# Patient Record
Sex: FEMALE | Race: WHITE
[De-identification: names, ages, dates, MRNs, and addresses within clinical notes are randomized per-mention and may not be internally consistent; named-entity substitution may affect disease eponyms.]

---

## 2021-06-12 ENCOUNTER — HOSPITAL ENCOUNTER (EMERGENCY)
Dept: HOSPITAL 56 - MW.ED | Age: 36
LOS: 1 days | Discharge: HOME | End: 2021-06-13
Payer: COMMERCIAL

## 2021-06-12 DIAGNOSIS — K80.20: Primary | ICD-10-CM

## 2021-06-12 PROCEDURE — 80053 COMPREHEN METABOLIC PANEL: CPT

## 2021-06-12 PROCEDURE — 99284 EMERGENCY DEPT VISIT MOD MDM: CPT

## 2021-06-12 PROCEDURE — 85025 COMPLETE CBC W/AUTO DIFF WBC: CPT

## 2021-06-12 PROCEDURE — 83690 ASSAY OF LIPASE: CPT

## 2021-06-12 PROCEDURE — 96372 THER/PROPH/DIAG INJ SC/IM: CPT

## 2021-06-12 PROCEDURE — 81025 URINE PREGNANCY TEST: CPT

## 2021-06-12 PROCEDURE — 36415 COLL VENOUS BLD VENIPUNCTURE: CPT

## 2021-06-12 NOTE — EDM.PDOC
ED HPI GENERAL MEDICAL PROBLEM





- General


Chief Complaint: Abdominal Pain


Stated Complaint: GALLSTONE, ABDOMINAL PAIN


Time Seen by Provider: 06/12/21 22:57


Source of Information: Reports: Patient


History Limitations: Reports: No Limitations





- History of Present Illness


INITIAL COMMENTS - FREE TEXT/NARRATIVE: 





Patient is a 36-year-old female who presents today for sarah pain.  Patient was

seen yesterday in the walk-in clinic ultrasound shows gallstones and possible in

her neck.  Patient the time not having cholecystitis and was sent home to 

follow-up with surgery on Monday.  Patient is sedated the pain increase in 

abdomen but not any worse or better with anything was not radiating.  Patient 

she is able tolerate p.o.  Patient not take any meds for pain at home.


  ** abd


Pain Score (Numeric/FACES): 4





- Related Data


                                    Allergies











Allergy/AdvReac Type Severity Reaction Status Date / Time


 


No Known Allergies Allergy   Verified 06/12/21 22:33











Home Meds: 


                                    Home Meds





. [No Known Home Meds]  06/12/21 [History]











Past Medical History





- Past Health History


Medical/Surgical History: Denies Medical/Surgical History





- Infectious Disease History


Infectious Disease History: Reports: Chicken Pox





Social & Family History





- Family History


Family Medical History: Unobtainable





ED ROS GENERAL





- Review of Systems


Review Of Systems: See Below


Constitutional: Reports: No Symptoms


HEENT: Reports: No Symptoms


Respiratory: Reports: No Symptoms


Cardiovascular: Reports: No Symptoms


Endocrine: Reports: No Symptoms


GI/Abdominal: Reports: Abdominal Pain


: Reports: No Symptoms


Musculoskeletal: Reports: No Symptoms


Skin: Reports: No Symptoms


Neurological: Reports: No Symptoms


Psychiatric: Reports: No Symptoms


Hematologic/Lymphatic: Reports: No Symptoms


Immunologic: Reports: No Symptoms





ED EXAM, GI/ABD





- Physical Exam


Exam: See Below


Exam Limited By: No Limitations


General Appearance: Alert, WD/WN, No Apparent Distress


Respiratory/Chest: No Respiratory Distress, Lungs Clear, Normal Breath Sounds


Cardiovascular: Normal Peripheral Pulses, Regular Rate, Rhythm


GI/Abdominal Exam: Normal Bowel Sounds, Soft, No Distention, Tender


Extremities: Normal Inspection, Normal Range of Motion


Neurological: Alert, Oriented, CN II-XII Intact, Normal Cognition, Normal Gait





Course





- Vital Signs


Last Recorded V/S: 


                                Last Vital Signs











Temp  97.4 F   06/12/21 22:10


 


Pulse  75   06/12/21 22:10


 


Resp  18   06/12/21 22:10


 


BP  138/76   06/12/21 22:10


 


Pulse Ox  99   06/12/21 22:10














- Orders/Labs/Meds


Labs: 


                                Laboratory Tests











  06/12/21 06/12/21 06/12/21 Range/Units





  23:44 23:50 23:50 


 


WBC   10.99   (4.0-11.0)  K/uL


 


RBC   4.37   (4.30-5.90)  M/uL


 


Hgb   14.1   (12.0-16.0)  g/dL


 


Hct   41.4   (36.0-46.0)  %


 


MCV   94.7   (80.0-98.0)  fL


 


MCH   32.3 H   (27.0-32.0)  pg


 


MCHC   34.1   (31.0-37.0)  g/dL


 


RDW Std Deviation   42.8   (28.0-62.0)  fl


 


RDW Coeff of Ely   12   (11.0-15.0)  %


 


Plt Count   217   (150-400)  K/uL


 


MPV   10.70   (7.40-12.00)  fL


 


Neut % (Auto)   83.5 H   (48.0-80.0)  %


 


Lymph % (Auto)   10.0 L   (16.0-40.0)  %


 


Mono % (Auto)   6.1   (0.0-15.0)  %


 


Eos % (Auto)   0.2   (0.0-7.0)  %


 


Baso % (Auto)   0.2   (0.0-1.5)  %


 


Neut # (Auto)   9.2 H   (1.4-5.7)  K/uL


 


Lymph # (Auto)   1.1   (0.6-2.4)  K/uL


 


Mono # (Auto)   0.7   (0.0-0.8)  K/uL


 


Eos # (Auto)   0.0   (0.0-0.7)  K/uL


 


Baso # (Auto)   0.0   (0.0-0.1)  K/uL


 


Nucleated RBC %   0.0   /100WBC


 


Nucleated RBCs #   0   K/uL


 


Sodium    135 L  (136-145)  mmol/L


 


Potassium    3.6  (3.5-5.1)  mmol/L


 


Chloride    98  ()  mmol/L


 


Carbon Dioxide    23.2  (21.0-32.0)  mmol/L


 


BUN    11  (7.0-18.0)  mg/dL


 


Creatinine    0.8  (0.6-1.0)  mg/dL


 


Est Cr Clr Drug Dosing    TNP  


 


Estimated GFR (MDRD)    > 60.0  ml/min


 


Glucose    94  ()  mg/dL


 


Calcium    8.6  (8.5-10.1)  mg/dL


 


Total Bilirubin    0.6  (0.2-1.0)  mg/dL


 


AST    14 L  (15-37)  IU/L


 


ALT    20  (14-63)  IU/L


 


Alkaline Phosphatase    76  ()  U/L


 


Total Protein    7.7  (6.4-8.2)  g/dL


 


Albumin    4.0  (3.4-5.0)  g/dL


 


Globulin    3.7  (2.6-4.0)  g/dL


 


Albumin/Globulin Ratio    1.1  (0.9-1.6)  


 


Lipase    66 L  ()  U/L


 


Urine HCG, Qual  NEGATIVE    (NEGATIVE)  











Meds: 


Medications














Discontinued Medications














Generic Name Dose Route Start Last Admin





  Trade Name Freq  PRN Reason Stop Dose Admin


 


Ketorolac Tromethamine  15 mg  06/12/21 23:05  06/12/21 23:51





  Ketorolac 15 Mg/Ml Sdv  IM  06/12/21 23:06  15 mg





  ONETIME ONE   Administration














- Re-Assessments/Exams


Free Text/Narrative Re-Assessment/Exam: 





06/13/21 00:43


Patient pain has been well controlled with Toradol in the ED.  Patient is 

already scheduled to see surgery on Monday.  Repeated enzymes are within normal 

limits.  Patient will be discharged home with pain control.





Departure





- Departure


Time of Disposition: 00:44


Disposition: Home, Self-Care 01


Condition: Good


Clinical Impression: 


 Cholelithiasis








- Discharge Information


*PRESCRIPTION DRUG MONITORING PROGRAM REVIEWED*: Not Applicable


*COPY OF PRESCRIPTION DRUG MONITORING REPORT IN PATIENT ROBERT: Not Applicable


Instructions:  Cholelithiasis, Easy-to-Read


Referrals: 


PCP,None [Primary Care Provider] - 


Forms:  ED Department Discharge


Additional Instructions: 


The following information is given to patients seen in the emergency department 

who are being discharged to home. This information is to outline your options 

for follow-up care. We provide all patients seen in our emergency department 

with a follow-up referral.





The need for follow-up, as well as the timing and circumstances, are variable 

depending upon the specifics of your emergency department visit.





If you don't have a primary care physician on staff, we will provide you with a 

referral. We always advise you to contact your personal physician following an 

emergency department visit to inform them of the circumstance of the visit and 

for follow-up with them and/or the need for any referrals to a consulting 

specialist.





The emergency department will also refer you to a specialist when appropriate. 

This referral assures that you have the opportunity for follow-up care with a 

specialist. All of these measure are taken in an effort to provide you with 

optimal care, which includes your follow-up.





Under all circumstances we always encourage you to contact your private 

physician who remains a resource for coordinating your care. When calling for 

follow-up care, please make the office aware that this follow-up is from your 

recent emergency room visit. If for any reason you are refused follow-up, please

contact the [facility] Emergency Department

at [phone] and asked to speak to the emergency department charge nurse.





Please follow up with your primary care physician. If you do not have a primary 

care physician, see below:


[facility]


[address] [place] [id number] [phone]





[facility]


[address] [place] [id number] [phone]








You are seen today for abdominal pain.  Previous ultrasound showed you have a 

gallstone in the gallbladder bladder neck.  This may at some point require 

surgery but she have follow-up on Monday.  We will send you home with some pain 

medicine to get you through the weekend continue to follow-up.  But if your pain

becomes too intense please return to the ED.  Otherwise follow-up with your 

schedule appointment.





Sepsis Event Note (ED)





- Evaluation


Sepsis Screening Result: No Definite Risk





- Focused Exam


Vital Signs: 


                                   Vital Signs











  Temp Pulse Resp BP Pulse Ox


 


 06/12/21 22:10  97.4 F  75  18  138/76  99














- Assessment/Plan


Plan: 





Patient is a 36-year-old female presents today for sarah pain.  Patient does 

have some tenderness epigastric area.  Will obtain basic labs and reassess 

patient.  Will provide pain control.

## 2021-06-13 LAB
BUN SERPL-MCNC: 11 MG/DL (ref 7–18)
CHLORIDE SERPL-SCNC: 98 MMOL/L (ref 98–107)
CO2 SERPL-SCNC: 23.2 MMOL/L (ref 21–32)
GLUCOSE SERPL-MCNC: 94 MG/DL (ref 74–106)
LIPASE SERPL-CCNC: 66 U/L (ref 73–393)
POTASSIUM SERPL-SCNC: 3.6 MMOL/L (ref 3.5–5.1)
SODIUM SERPL-SCNC: 135 MMOL/L (ref 136–145)

## 2021-06-17 ENCOUNTER — HOSPITAL ENCOUNTER (INPATIENT)
Dept: HOSPITAL 56 - MW.SDS | Age: 36
LOS: 2 days | Discharge: HOME | DRG: 263 | End: 2021-06-19
Attending: SURGERY | Admitting: SURGERY
Payer: COMMERCIAL

## 2021-06-17 DIAGNOSIS — K82.1: ICD-10-CM

## 2021-06-17 DIAGNOSIS — K80.00: Primary | ICD-10-CM

## 2021-06-17 PROCEDURE — 0FT40ZZ RESECTION OF GALLBLADDER, OPEN APPROACH: ICD-10-PCS | Performed by: SURGERY

## 2021-06-17 PROCEDURE — 0FJ44ZZ INSPECTION OF GALLBLADDER, PERCUTANEOUS ENDOSCOPIC APPROACH: ICD-10-PCS | Performed by: SURGERY

## 2021-06-17 RX ADMIN — KETOROLAC TROMETHAMINE SCH MG: 15 INJECTION, SOLUTION INTRAMUSCULAR; INTRAVENOUS at 19:34

## 2021-06-17 NOTE — PCM.OPNOTE
- General Post-Op/Procedure Note


Date of Surgery/Procedure: 06/17/21


Operative Procedure(s): Laparoscopic converted to open cholecystectomy


Findings: 





Severely inflamed, enlarged and thickened gallbladder. Gallbladder hydrops. 


Pre Op Diagnosis: Symptomatic cholelithiasis


Post-Op Diagnosis: Acute cholecystitis secondary to cholelithiasis


Anesthesia Technique: General ET Tube


Primary Surgeon: Samia Tony


Pathology: 





gallbladder


Fluid Replacement, Intraop: 1,600


Output, Urine Amount: 300


EBL in mLs: 150


Condition: Good

## 2021-06-17 NOTE — PCM.PREANE
Preanesthetic Assessment





- Anesthesia/Transfusion/Family Hx


Anesthesia History: Prior Anesthesia Without Reaction


Transfusion History: No Prior Transfusion(s)





- Review of Systems


General: No Symptoms


Pulmonary: No Symptoms


Cardiovascular: No Symptoms


Gastrointestinal: No Symptoms


Neurological: No Symptoms


Other: Reports: None





- Physical Assessment


NPO Status Date: 21


NPO Status Time: 00:00


Vital Signs: 





                                Last Vital Signs











Temp  98.6 F   21 08:45


 


Pulse  101 H  21 08:45


 


Resp  15   21 08:45


 


BP  138/91 H  21 08:45


 


Pulse Ox  94 L  21 08:45











Height: 5 ft 3 in


Weight: 129 lb


ASA Class: 1


Mental Status: Alert & Oriented x3


Dentition: Reports: Normal Dentition


Thyro-Mental Finger Breadths: 3


Mouth Opening Finger Breadths: 3


ROM/Head Extension: Full


Lungs: Clear to Auscultation, Normal Respiratory Effort


Cardiovascular: Regular Rate, Regular Rhythm





- Lab


Values: 





                             Laboratory Last Values











Urine HCG, Qual  NEGATIVE  (NEGATIVE)   21  08:28    














- Allergies


Allergies/Adverse Reactions: 


                                    Allergies











Allergy/AdvReac Type Severity Reaction Status Date / Time


 


No Known Allergies Allergy   Verified 06/15/21 09:49














- Blood


Blood Available: No





- Anesthesia Plan


Pre-Op Medication Ordered: Other (scopolamine patch)





- Acknowledgements


Anesthesia Type Planned: General Anesthesia


Pt an Appropriate Candidate for the Planned Anesthesia: Yes


Alternatives and Risks of Anesthesia Discussed w Pt/Guardian: Yes


Pt/Guardian Understands and Agrees with Anesthesia Plan: Yes





PreAnesthesia Questionnaire





- Past Health History


Medical/Surgical History: Denies Medical/Surgical History


HEENT History: Reports: Other (See Below)


Other HEENT History: wears glasses/contacts


Cardiovascular History: Reports: None


Respiratory History: Reports: None


Gastrointestinal History: Reports: Other (See Below)


Other Gastrointestinal History: symptomatic cholelithiasis


Genitourinary History: Reports: None


OB/GYN History: Reports: Pregnancy


Musculoskeletal History: Reports: None


Neurological History: Reports: None


Psychiatric History: Reports: None


Endocrine/Metabolic History: Reports: None


Hematologic History: Reports: None


Immunologic History: Reports: None


Oncologic (Cancer) History: Reports: None


Dermatologic History: Reports: None





- Infectious Disease History


Infectious Disease History: Reports: Chicken Pox





- Past Surgical History


Head Surgeries/Procedures: Reports: None


HEENT Surgical History: Reports: Oral Surgery


Cardiovascular Surgical History: Reports: None


Respiratory Surgical History: Reports: None


GI Surgical History: Reports: None


Female  Surgical History: Reports:  Section


Endocrine Surgical History: Reports: None


Neurological Surgical History: Reports: None


Musculoskeletal Surgical History: Reports: None


Oncologic Surgical History: Reports: None


Dermatological Surgical History: Reports: None





- SUBSTANCE USE


Tobacco Use Status *Q: Never Tobacco User





- HOME MEDS


Home Medications: 


                                    Home Meds





. [No Known Home Meds]  21 [History]











- CURRENT (IN HOUSE) MEDS


Current Meds: 





                               Current Medications





Albuterol (Albuterol 0.083% 2.5 Mg/3 Ml Neb Soln)  2.5 mg NEB ONETIME PRN


   PRN Reason: Wheezing


Droperidol (Droperidol 5 Mg/2 Ml Sdv)  0.625 mg IVPUSH ONETIME PRN


   PRN Reason: Nausea/Vomiting


Fentanyl (Fentanyl 100 Mcg/2 Ml Sdv)  50 mcg IVPUSH Q5M PRN


   PRN Reason: Pain (mild 1-3)


Hydromorphone HCl (Hydromorphone 2 Mg/Ml Syringe)  0.5 mg IVPUSH Q10M PRN


   PRN Reason: Pain (moderate 4-6)


Lactated Ringer's (Ringers, Lactated)  1,000 mls @ 125 mls/hr IV ASDIRECTED AMAN


   Last Admin: 21 08:50 Dose:  125 mls/hr


   Documented by: 


Metoclopramide HCl (Metoclopramide 10 Mg/2 Ml Sdv)  10 mg IVPUSH ONETIME PRN


   PRN Reason: Nausea/Vomiting


Morphine Sulfate (Morphine 2 Mg/Ml Syringe)  2 mg IVPUSH Q10M PRN


   PRN Reason: Pain (severe 7-10)


Naloxone HCl (Naloxone 0.4 Mg/Ml Syringe)  0.1 mg IVPUSH ASDIRECTED PRN


   PRN Reason: Respiratory Depression


Ondansetron HCl (Ondansetron 4 Mg/2 Ml Sdv)  4 mg IVPUSH ONETIME PRN


   PRN Reason: Nausea/Vomiting


Sodium Chloride (Sodium Chloride 0.9% 10 Ml Sdv)  10 ml IV ASDIRECTED PRN


   PRN Reason: IV Use


Sodium Chloride (Sodium Chloride 0.9% 10 Ml Syringe)  10 ml FLUSH ASDIRECTED PRN


   PRN Reason: Keep Vein Open


Sodium Chloride (Sodium Chloride 0.9% 2.5 Ml Syringe)  2.5 ml FLUSH ASDIRECTED 

PRN


   PRN Reason: Keep Vein Open





Discontinued Medications





Bupivacaine HCl (Bupivacaine 0.5% 30 Ml Sdv) Confirm Administered Dose 30 ml 

.ROUTE .STK-MED ONE


   Stop: 21 07:35


Fentanyl (Fentanyl 100 Mcg/2 Ml Sdv) Confirm Administered Dose 100 mcg .ROUTE 

.STK-MED ONE


   Stop: 21 08:55


Octyl Cyanoacrylate (Octyl 2-Cyanoacrylate 1 Tube) Confirm Administered Dose 1 

applic .ROUTE .STK-MED ONE


   Stop: 21 07:36

## 2021-06-17 NOTE — PCM.POSTAN
POST ANESTHESIA ASSESSMENT





- MENTAL STATUS


Mental Status: Oriented, Somnolent





- VITAL SIGNS


Vital Signs: 


                                Last Vital Signs











Temp  98.6 F   06/17/21 08:45


 


Pulse  101 H  06/17/21 08:45


 


Resp  15   06/17/21 08:45


 


BP  138/91 H  06/17/21 08:45


 


Pulse Ox  94 L  06/17/21 08:45














- RESPIRATORY


Respiratory Status: Respiratory Rate WNL, Airway Patent, O2 Saturation Stable





- CARDIOVASCULAR


CV Status: Pulse Rate WNL, Blood Pressure Stable





- GASTROINTESTINAL


GI Status: No Symptoms





- POST OP HYDRATION


Hydration Status: Adequate & Stable

## 2021-06-17 NOTE — OR
SURGEON:

SAMIA GREGG MD

 

DATE OF PROCEDURE:  06/17/2021

 

PREOPERATIVE DIAGNOSIS:

Symptomatic cholelithiasis.

 

POSTOPERATIVE DIAGNOSIS:

Acute cholecystitis secondary to cholelithiasis.

 

PROCEDURE PERFORMED:

Laparoscopic converted to open cholecystectomy.

 

PRIMARY SURGEON:

Samia Gregg MD

 

SECONDARY SURGEON:

Quan Bledsoe M.D.

 

ANESTHESIA:

General endotracheal anesthesia.

 

FLUIDS:

1600 mL crystalloid.

 

ESTIMATED BLOOD LOSS:

150 mL.

 

URINE OUTPUT:

300 mL.

 

FINDINGS:

Severely distended, inflamed, and thickened gallbladder, surrounded in

inflammatory rind.  Gallbladder contained hydroptic bile.

 

COMPLICATIONS:

None.

 

INDICATIONS:

The patient is a 36-year-old female who developed a sudden onset of right upper

quadrant pain last week.  She was seen in her primary care provider's office.

Her labs were grossly normal; however, her ultrasound showed a thickened and

distended gallbladder containing a stone in the neck of the gallbladder.  She

was referred to my clinic.  Over the weekend, she went to the emergency room due

to increasing pain.  When I saw her in clinic, the patient was tender in the

right upper quadrant and there was a thickened mass that I could palpate there.

I explained to the patient that she likely has acute cholecystitis and needs to

go to the operating room urgently.  I explained the need for a laparoscopic,

possible open cholecystectomy.  I explained the procedure, expected

perioperative course, and the risks.  She verbalized understanding and wishes to

proceed.

 

PROCEDURE IN DETAIL:

The patient was brought into the OR, placed on the OR table in supine position.

A time-out was completed verifying the patient's name, age, date of birth,

allergies, and procedure to be performed.  General endotracheal anesthesia was

induced.  The left arm was tucked to the patient's side and a Tsai catheter

placed.  The abdomen was prepped and draped in usual standard fashion.  I

anesthetized the infraumbilical fold with 0.5% Marcaine plain.  An 11 blade was

used to make an incision along the infraumbilical fold.  Cautery was used to

dissect down to the level of subcutaneous fat.  I bluntly dissected down to the

fascia.  The fascia was elevated with Kochers and incised sharply with a curved

Cassidy scissors.  Entry into the abdomen was palpated digitally.  Stay sutures

were placed on either side using 0 Vicryl suture.  A 12 mm Myrna trocar was

inserted into the abdomen and it was insufflated.  I inspected the area

underneath my initial trocar placement.  No damage to surrounding structures was

noted.  The patient was placed into reverse Trendelenburg position and airplaned

slightly to the left.  5 mm trocars were placed in the following locations under

direct visualization; one in the epigastric area, one in the right flank, and

one 2 fingerbreadths below the right subcostal margin in the midclavicular line.

When I turned my attention to the right upper quadrant, I could see that the

gallbladder was grossly distended and severely inflamed.  I grasped the

overlying omentum, which peeled away from the dome of the gallbladder.  Given

how tense and distended the gallbladder appeared, I brought an aspirating

laparoscopic needle into the field.  I pierced the dome of the gallbladder and

aspirated 100 mL of clear mucousy bile.  This confirmed that the patient had

hydrops from an obstructing stone in the neck of the gallbladder.  The needle

was removed and passed off the field.  I was able to grasp the dome of the

gallbladder and elevate it.  The gallbladder itself had a thickened and inflamed

rind.  The transverse colon and duodenum were densely adhered to the body of the

gallbladder itself.  Using suction, I gently peeled away as much of this rind as

I safely could; however, the adhesions were quite dense and because everything

was so inflamed, there was bleeding.  Given these findings, I felt it safest to

convert to open to take down the rest of these inflammatory adhesions with my

hand and to more safely dissect around my proximal gallbladder.  The 5 mm

trocars and a 12 mm Myrna trocar were removed.  The fascia at the

infraumbilical port site was closed with interrupted 0 Vicryl sutures.  The

subcutaneous fat was closed with an interrupted 3-0 Vicryl stitch.  The skin was

closed with a running 4-0 Monocryl stitch.  I anesthetized the right upper

quadrant with 0.5% Marcaine plain.  A right oblique incision was made using a 15

blade.  This incision was made 2 fingerbreadths below the right subcostal

margin.  Cautery was used to dissect down to the level of the fascia.  The

fascia was entered using electrocautery.  The muscle was then meticulously taken

down.  I grasped the posterior fascia and peritoneum with hemostats and incised

it sharply with a curved Cassidy scissors.  I then placed my finger into the

abdomen and extended my incision both medially and laterally.  I identified the

edge of the liver and placed my hand over the dome of the gallbladder.  I then

slid down the body of the gallbladder and began to finger dissect the adhesions

and inflammatory rind around the gallbladder itself.  Using finger dissection,

these peeled away easily and I was able to get down to the proximal aspect of my

gallbladder.  I could see what appeared to be the cystic duct, but it was again

covered in a dense inflammatory rind around the peritoneum.  Dr. Quan Bledsoe

was asked to come in and assist with the case.  Moistened laps and retractors

were put in place.  We began our dissection on the medial aspect of the

gallbladder.  Using a Kittner, we attempted to peel down some of the rind around

what appeared to be the cystic duct.  We were able to get some of it to come

down; however, the adhesions were quite dense.  The decision was made to proceed

instead in a dome down fashion.  Using a Justyn scissors and cautery, we began to

create a plane along the top of the gallbladder and the liver edge.  Using

gentle blunt dissection, we were eventually able to make a plane between the

gallbladder and the cystic plate itself.  Using cautery, we opened up the

peritoneal attachments laterally and medially coming down along the cystic

triangle.  We identified a vascular-appearing structure which was proximal to

what appeared to be the cystic duct.  Using right angle, we cleared this away.

It appeared to be the cystic artery.  We doubly clipped and ligated this.  After

cutting it, we could see that the cut end of the stump was pulsatile.  Using a

Kittner, we then took down the remainder of the attachments around the cystic

duct.  We were eventually able to clear these all away and clearly see the

insertion of the cystic duct on both the gallbladder as well as the common bile

duct.  Her cystic duct was quite short.  We used a 5 mm clip applier to clip the

cystic duct as close to the gallbladder as we could.  3-0 Vicryl ties were then

placed on the proximal end of the cystic duct.  It was then doubly sutured, then

ligated.  The gallbladder was passed off the field.  Overall, the field appeared

quite hemostatic.  We irrigated it with copious amounts of normal saline mixed

with Ancef.  This was then all suctioned out.  Tye and Surgicel were then

placed in the gallbladder fossa to ensure continued hemostasis.  A 19-Austrian

Barrington drain was brought out through the right flank port site.  It was placed

under the liver bed in our operative field.  It was secured to the skin using a

2-0 silk suture.  Once we had ensured that all counts were complete and correct,

we then began to close the abdomen.  The posterior fascia and peritoneum were

closed with a running 0 Vicryl suture.  The anterior fascia was closed with

interrupted 0 Ethibond sutures.  An On-Q pump was then placed on top of the

fascia, and the fascia was additionally anesthetized with 0.5% Marcaine plain.

The subcutaneous fat was closed with a deep running 3-0 Vicryl suture and

interrupted superficial 3-0 Vicryl sutures.  The skin was closed with staples.

The epigastric port site was closed with interrupted 4-0 Vicryl.  My

infraumbilical and epigastric port sites were covered with Steri-Strips.  The

umbilical incision was covered with a Tegaderm.  4 x 4 fluffs and tape were used

to cover my right oblique incision and the epigastric incision.  It was secured

in place with tape.  The patient was extubated and taken to PACU in stable

condition.  All counts were complete and correct at the end of the case.

 

 

JAMAAL / ALEXA

DD:  06/17/2021 15:24:34

DT:  06/17/2021 17:12:36

Job #:  [id number]

## 2021-06-18 LAB
BUN SERPL-MCNC: 6 MG/DL (ref 7–18)
CHLORIDE SERPL-SCNC: 100 MMOL/L (ref 98–107)
CO2 SERPL-SCNC: 27.3 MMOL/L (ref 21–32)
GLUCOSE SERPL-MCNC: 114 MG/DL (ref 74–106)
POTASSIUM SERPL-SCNC: 3.9 MMOL/L (ref 3.5–5.1)
SODIUM SERPL-SCNC: 135 MMOL/L (ref 136–145)

## 2021-06-18 RX ADMIN — KETOROLAC TROMETHAMINE SCH MG: 15 INJECTION, SOLUTION INTRAMUSCULAR; INTRAVENOUS at 02:00

## 2021-06-18 RX ADMIN — KETOROLAC TROMETHAMINE SCH MG: 15 INJECTION, SOLUTION INTRAMUSCULAR; INTRAVENOUS at 07:48

## 2021-06-18 RX ADMIN — KETOROLAC TROMETHAMINE SCH MG: 15 INJECTION, SOLUTION INTRAMUSCULAR; INTRAVENOUS at 13:13

## 2021-06-18 NOTE — PCM.DCSUM1
**Discharge Summary





- Hospital Course


Free Text/Narrative:: 


Patient is a 36-year-old female who came to my clinic on Monday with right upper

quadrant abdominal pain. Her pre-visit workup was concerning for acute 

cholecystitis however her white count was normal and the right upper quadrant 

ultrasound showed a distended and thickened gallbladder but was not read as 

acute cholecystitis. The patient was scheduled for urgent laparoscopic possible 

open cholecystectomy. Intraoperatively she was found to have a thickened and 

densely inflamed gallbladder with an inflammatory rind attaching the gallbladder

to the surrounding tissues. Her surgery was converted from laparoscopic to open 

to allow safe dissection and removal of the gallbladder. In the postoperative 

period the patient remained stable. My partner came on POD #2. She was stable 

and drains were removed. She was discharged home. 





- Discharge Data


Discharge Date: 06/19/21


Discharge Disposition: Home, Self-Care 01


Condition: Good





- Referral to Home Health


Primary Care Physician: 


PCP None








- Discharge Diagnosis/Problem(s)


(1) Acute cholecystitis due to biliary calculus


SNOMED Code(s): 62725164268887


   ICD Code: K80.00 - CALCULUS OF GALLBLADDER W ACUTE CHOLECYST W/O OBSTRUCTION 

 Status: Acute   





- Patient Summary/Data


Operative Procedure(s) Performed: Laparoscopic converted to open cholecystectomy





- Patient Instructions


Diet: Regular Diet as Tolerated, Drink 8-10+ Glasses/Day


Diet, Other: Avoid greasy/fatty foods for one month after surgery.


Activity: No Lifting Over 20 Pounds (For 6 weeks), Rest and Relax Today


Driving: Do Not Drive (For one week)


Showering/Bathing: May Shower, No Tub Bathing/Swimming (For 2 weeks)


Wound/Incision Care: Keep Operative Site/Wound Site Clean and Dry


Notify Provider of: Fever, Increased Pain, Swelling and Redness, Drainage, 

Nausea and/or Vomiting


Other/Special Instructions: Take Toradol and Flexeril on a scheduled basis until

you're done with these medications. After that he can use ibuprofen as needed 

for pain. Take Percocet for moderate to severe pain. Do not take additional 

Tylenol when taking the Percocet.  You will find that your energy level is low. 

This is due to your body healing. Avoid strenuous activity or heavy lifting for 

the next 6 weeks.  Take a multivitamin at home. Drink plenty of water and use a 

fiber supplement to promote regular bowel movements. Take an over-the-counter 

laxative as needed for any constipation.





- Discharge Plan


*PRESCRIPTION DRUG MONITORING PROGRAM REVIEWED*: Yes


*COPY OF PRESCRIPTION DRUG MONITORING REPORT IN PATIENT ROBERT: Yes


Tobacco Cessation Medication: Prescription Given


Home Medications: 


                                    Home Meds





. [No Known Home Meds]  06/12/21 [History]








Patient Handouts:  Cyclobenzaprine tablets, Open Cholecystectomy, Open 

Cholecystectomy, Care After, Acetaminophen; Oxycodone tablets, Ketorolac Oral 

Tablets


Referrals: 


Samia Tony MD [Physician] - 06/25/21 9:30 am





- Discharge Summary/Plan Comment


DC Time >30 min.: No





- General Info


Date of Service: 06/19/21


Functional Status: Reports: Pain Controlled, Tolerating Diet, Ambulating, 

Urinating





- Review of Systems


General: Reports: No Symptoms


HEENT: Reports: No Symptoms


Pulmonary: Reports: No Symptoms


Cardiovascular: Reports: No Symptoms


Gastrointestinal: Reports: No Symptoms





- Patient Data


Vitals - Most Recent: 


                                Last Vital Signs











Temp  36.4 C   06/18/21 15:30


 


Pulse  106 H  06/18/21 15:30


 


Resp  17   06/18/21 15:30


 


BP  102/56 L  06/18/21 15:30


 


Pulse Ox  98   06/18/21 15:30











Weight - Most Recent: 58.513 kg


I&O - Last 24 hours: 


                                 Intake & Output











 06/18/21 06/18/21 06/18/21





 06:59 14:59 22:59


 


Intake Total 1700  1200


 


Output Total 80 1550 2490


 


Balance 91F -1550 -1290











Lab Results - Last 24 hrs: 


                         Laboratory Results - last 24 hr











  06/18/21 06/18/21 Range/Units





  05:16 05:16 


 


WBC  7.18   (4.0-11.0)  K/uL


 


RBC  3.74 L   (4.30-5.90)  M/uL


 


Hgb  11.8 L   (12.0-16.0)  g/dL


 


Hct  35.2 L   (36.0-46.0)  %


 


MCV  94.1   (80.0-98.0)  fL


 


MCH  31.6   (27.0-32.0)  pg


 


MCHC  33.5   (31.0-37.0)  g/dL


 


RDW Std Deviation  43.0   (28.0-62.0)  fl


 


RDW Coeff of Ely  13   (11.0-15.0)  %


 


Plt Count  278   (150-400)  K/uL


 


MPV  10.20   (7.40-12.00)  fL


 


Nucleated RBC %  0.0   /100WBC


 


Nucleated RBCs #  0   K/uL


 


Sodium   135 L  (136-145)  mmol/L


 


Potassium   3.9  (3.5-5.1)  mmol/L


 


Chloride   100  ()  mmol/L


 


Carbon Dioxide   27.3  (21.0-32.0)  mmol/L


 


BUN   6 L  (7.0-18.0)  mg/dL


 


Creatinine   0.7  (0.6-1.0)  mg/dL


 


Est Cr Clr Drug Dosing   90.14  mL/min


 


Estimated GFR (MDRD)   > 60.0  ml/min


 


Glucose   114 H  ()  mg/dL


 


Calcium   8.0 L  (8.5-10.1)  mg/dL


 


Total Bilirubin   0.3  (0.2-1.0)  mg/dL


 


AST   35  (15-37)  IU/L


 


ALT   40  (14-63)  IU/L


 


Alkaline Phosphatase   79  ()  U/L


 


Total Protein   5.9 L  (6.4-8.2)  g/dL


 


Albumin   2.4 L  (3.4-5.0)  g/dL


 


Globulin   3.5  (2.6-4.0)  g/dL


 


Albumin/Globulin Ratio   0.7 L  (0.9-1.6)  











Med Orders - Current: 


                               Current Medications





Cyclobenzaprine HCl (Cyclobenzaprine 5 Mg Tab)  5 mg PO TID [facility]


   Last Admin: 06/18/21 13:12 Dose:  5 mg


   Documented by: 


Hydromorphone HCl (Hydromorphone 2 Mg/Ml Syringe)  0.5 mg IVPUSH Q1H PRN


   PRN Reason: Pain (severe 7-10)


Acetaminophen 1,000 mg/ Premix  100 mls @ 400 mls/hr IV ONETIME PRN


   PRN Reason: PAIN


Oxycodone HCl (Oxycodone 5 Mg Tab)  10 mg PO Q4H PRN


   PRN Reason: Abdominal Pain


   Last Admin: 06/18/21 07:41 Dose:  10 mg


   Documented by: 


Polyethylene Glycol (Polyethylene Glycol 3350 Powder 17 Gm Packet)  17 gm PO 

DAILY [facility]


   Last Admin: 06/18/21 08:39 Dose:  17 gm


   Documented by: 


Sodium Chloride (Sodium Chloride 0.9% 10 Ml Sdv)  10 ml IV ASDIRECTED PRN


   PRN Reason: IV Use


Sodium Chloride (Sodium Chloride 0.9% 10 Ml Syringe)  10 ml FLUSH ASDIRECTED PRN


   PRN Reason: Keep Vein Open


Sodium Chloride (Sodium Chloride 0.9% 2.5 Ml Syringe)  2.5 ml FLUSH ASDIRECTED 

PRN


   PRN Reason: Keep Vein Open





Discontinued Medications





Albuterol (Albuterol 0.083% 2.5 Mg/3 Ml Neb Soln)  2.5 mg NEB ONETIME PRN


   PRN Reason: Wheezing


Bupivacaine HCl (Bupivacaine 0.5% 30 Ml Sdv) Confirm Administered Dose 30 ml 

.ROUTE .STK-MED ONE


   Stop: 06/17/21 07:35


Bupivacaine HCl (Bupivacaine 0.5% 30 Ml Sdv) Confirm Administered Dose 120 ml 

.ROUTE .STK-MED ONE


   Stop: 06/17/21 12:10


Cefazolin Sodium (Cefazolin 1 Gm Vial) Confirm Administered Dose 1 gm .ROUTE 

.STK-MED ONE


   Stop: 06/17/21 12:14


Cefazolin Sodium (Cefazolin 1 Gm Vial) Confirm Administered Dose 2 gm .ROUTE 

.STK-MED ONE


   Stop: 06/17/21 12:15


Droperidol (Droperidol 5 Mg/2 Ml Sdv)  0.625 mg IVPUSH ONETIME PRN


   PRN Reason: Nausea/Vomiting


Fentanyl (Fentanyl 100 Mcg/2 Ml Sdv)  50 mcg IVPUSH Q5M PRN


   PRN Reason: Pain (mild 1-3)


Fentanyl (Fentanyl 100 Mcg/2 Ml Sdv) Confirm Administered Dose 100 mcg .ROUTE 

.STK-MED ONE


   Stop: 06/17/21 08:55


Hydromorphone HCl (Hydromorphone 2 Mg/Ml Syringe)  0.5 mg IVPUSH Q10M PRN


   PRN Reason: Pain (moderate 4-6)


Hydromorphone HCl (Hydromorphone 2 Mg/Ml Syringe) Confirm Administered Dose 2 mg

 .ROUTE .STK-MED ONE


   Stop: 06/17/21 13:54


Lactated Ringer's (Ringers, Lactated)  1,000 mls @ 125 mls/hr IV ASDIRECTED AMAN


   Last Admin: 06/18/21 06:28 Dose:  125 mls/hr


   Documented by: 


Propofol (Diprivan 100 Ml) Confirm Administered Dose 100 mls @ as directed 

.ROUTE .STK-MED ONE


   Stop: 06/17/21 09:30


Acetaminophen (Ofirmev 1000 Mg/100 Ml) Confirm Administered Dose 100 mls @ as 

directed .ROUTE .STK-MED ONE


   Stop: 06/17/21 10:09


Ketorolac Tromethamine (Ketorolac 30 Mg/Ml Sdv) Confirm Administered Dose 30 mg 

.ROUTE .ST-MED ONE


   Stop: 06/17/21 13:37


Ketorolac Tromethamine (Ketorolac 15 Mg/Ml Sdv)  15 mg IVPUSH Q6H AMAN


   Stop: 06/18/21 14:01


   Last Admin: 06/18/21 13:13 Dose:  15 mg


   Documented by: 


Lidocaine (Lidocaine 2% 5 Ml Sdv) Confirm Administered Dose 5 ml .ROUTE .STK-MED

 ONE


   Stop: 06/17/21 09:16


Lidocaine (Lidocaine 2% 5 Ml Sdv) Confirm Administered Dose 5 ml .ROUTE .ST-MED

 ONE


   Stop: 06/17/21 09:16


Lidocaine HCl (Lidocaine 1% 5 Ml Sdv) Confirm Administered Dose 0 ml .ROUTE 

.ST-MED ONE


   Stop: 06/17/21 09:16


Metoclopramide HCl (Metoclopramide 10 Mg/2 Ml Sdv)  10 mg IVPUSH ONETIME PRN


   PRN Reason: Nausea/Vomiting


Midazolam HCl (Midazolam 1 Mg/Ml 2 Ml Sdv) Confirm Administered Dose 2 mg .ROUTE

 .ST-MED ONE


   Stop: 06/17/21 09:29


Morphine Sulfate (Morphine 2 Mg/Ml Syringe)  2 mg IVPUSH Q10M PRN


   PRN Reason: Pain (severe 7-10)


Morphine Sulfate (Morphine 10 Mg/Ml Syringe) Confirm Administered Dose 10 mg 

.ROUTE .ST-MED ONE


   Stop: 06/17/21 09:17


Naloxone HCl (Naloxone 0.4 Mg/Ml Syringe)  0.1 mg IVPUSH ASDIRECTED PRN


   PRN Reason: Respiratory Depression


Octyl Cyanoacrylate (Octyl 2-Cyanoacrylate 1 Tube) Confirm Administered Dose 1 

applic .ROUTE .ST-MED ONE


   Stop: 06/17/21 07:36


Ondansetron HCl (Ondansetron 4 Mg/2 Ml Sdv)  4 mg IVPUSH ONETIME PRN


   PRN Reason: Nausea/Vomiting


Ondansetron HCl (Ondansetron 4 Mg/2 Ml Sdv) Confirm Administered Dose 4 mg 

.ROUTE .STK-MED ONE


   Stop: 06/17/21 09:16


Ondansetron HCl (Ondansetron 4 Mg/2 Ml Sdv) Confirm Administered Dose 4 mg 

.ROUTE .STK-MED ONE


   Stop: 06/17/21 09:16


Rocuronium Bromide (Rocuronium Bromide 50 Mg/5 Ml Syringe) Confirm Administered 

Dose 50 mg .ROUTE .STK-MED ONE


   Stop: 06/17/21 09:16


Rocuronium Bromide (Rocuronium Bromide 50 Mg/5 Ml Syringe) Confirm Administered 

Dose 50 mg .ROUTE .STK-MED ONE


   Stop: 06/17/21 12:15


Rocuronium Bromide (Rocuronium Bromide 50 Mg/5 Ml Syringe) Confirm Administered 

Dose 50 mg .ROUTE .STK-MED ONE


   Stop: 06/17/21 12:47


Sugammadex Sodium (Sugammadex Sodium 200 Mg/2 Ml Vial) Confirm Administered Dose

 200 mg .ROUTE .STK-MED ONE


   Stop: 06/17/21 09:16











- Exam


General: Reports: Alert, Oriented


HEENT: Reports: Pupils Equal, Pupils Reactive


Lungs: Reports: Normal Respiratory Effort


Cardiovascular: Reports: Regular Rate


GI/Abdominal Exam: Soft, Non-Tender, No Distention, No Mass

## 2021-06-18 NOTE — PCM.SURGPN
- General Info


Date of Service: 06/18/21


Date of Surgery/Procedure: 06/17/21


POD#: 1


Functional Status: Reports: Pain Controlled, Tolerating Diet, Urinating, 

Incentive Spirometry.  Denies: New Symptoms





- Review of Systems


General: Reports: No Symptoms


HEENT: Reports: No Symptoms


Pulmonary: Reports: No Symptoms


Cardiovascular: Reports: No Symptoms


Gastrointestinal: Reports: Abdominal Pain (When moving)


Genitourinary: Reports: No Symptoms


Musculoskeletal: Reports: No Symptoms


Skin: Reports: No Symptoms





- Patient Data


Vitals - Most Recent: 


                                Last Vital Signs











Temp  36.4 C   06/18/21 15:30


 


Pulse  106 H  06/18/21 15:30


 


Resp  17   06/18/21 15:30


 


BP  102/56 L  06/18/21 15:30


 


Pulse Ox  98   06/18/21 15:30











Weight - Most Recent: 58.513 kg


I&O - Last 24 Hours: 


                                 Intake & Output











 06/18/21 06/18/21 06/18/21





 06:59 14:59 22:59


 


Intake Total 1700  1200


 


Output Total 80 1550 2490


 


Balance 91F -1550 -1290











Lab Results Last 24 Hrs: 


                         Laboratory Results - last 24 hr











  06/18/21 06/18/21 Range/Units





  05:16 05:16 


 


WBC  7.18   (4.0-11.0)  K/uL


 


RBC  3.74 L   (4.30-5.90)  M/uL


 


Hgb  11.8 L   (12.0-16.0)  g/dL


 


Hct  35.2 L   (36.0-46.0)  %


 


MCV  94.1   (80.0-98.0)  fL


 


MCH  31.6   (27.0-32.0)  pg


 


MCHC  33.5   (31.0-37.0)  g/dL


 


RDW Std Deviation  43.0   (28.0-62.0)  fl


 


RDW Coeff of Ely  13   (11.0-15.0)  %


 


Plt Count  278   (150-400)  K/uL


 


MPV  10.20   (7.40-12.00)  fL


 


Nucleated RBC %  0.0   /100WBC


 


Nucleated RBCs #  0   K/uL


 


Sodium   135 L  (136-145)  mmol/L


 


Potassium   3.9  (3.5-5.1)  mmol/L


 


Chloride   100  ()  mmol/L


 


Carbon Dioxide   27.3  (21.0-32.0)  mmol/L


 


BUN   6 L  (7.0-18.0)  mg/dL


 


Creatinine   0.7  (0.6-1.0)  mg/dL


 


Est Cr Clr Drug Dosing   90.14  mL/min


 


Estimated GFR (MDRD)   > 60.0  ml/min


 


Glucose   114 H  ()  mg/dL


 


Calcium   8.0 L  (8.5-10.1)  mg/dL


 


Total Bilirubin   0.3  (0.2-1.0)  mg/dL


 


AST   35  (15-37)  IU/L


 


ALT   40  (14-63)  IU/L


 


Alkaline Phosphatase   79  ()  U/L


 


Total Protein   5.9 L  (6.4-8.2)  g/dL


 


Albumin   2.4 L  (3.4-5.0)  g/dL


 


Globulin   3.5  (2.6-4.0)  g/dL


 


Albumin/Globulin Ratio   0.7 L  (0.9-1.6)  











Med Orders - Current: 


                               Current Medications





Cyclobenzaprine HCl (Cyclobenzaprine 5 Mg Tab)  5 mg PO TID [facility]


   Last Admin: 06/18/21 13:12 Dose:  5 mg


   Documented by: 


Hydromorphone HCl (Hydromorphone 2 Mg/Ml Syringe)  0.5 mg IVPUSH Q1H PRN


   PRN Reason: Pain (severe 7-10)


Acetaminophen 1,000 mg/ Premix  100 mls @ 400 mls/hr IV ONETIME PRN


   PRN Reason: PAIN


Oxycodone HCl (Oxycodone 5 Mg Tab)  10 mg PO Q4H PRN


   PRN Reason: Abdominal Pain


   Last Admin: 06/18/21 07:41 Dose:  10 mg


   Documented by: 


Polyethylene Glycol (Polyethylene Glycol 3350 Powder 17 Gm Packet)  17 gm PO 

DAILY [facility]


   Last Admin: 06/18/21 08:39 Dose:  17 gm


   Documented by: 


Sodium Chloride (Sodium Chloride 0.9% 10 Ml Sdv)  10 ml IV ASDIRECTED PRN


   PRN Reason: IV Use


Sodium Chloride (Sodium Chloride 0.9% 10 Ml Syringe)  10 ml FLUSH ASDIRECTED PRN


   PRN Reason: Keep Vein Open


Sodium Chloride (Sodium Chloride 0.9% 2.5 Ml Syringe)  2.5 ml FLUSH ASDIRECTED 

PRN


   PRN Reason: Keep Vein Open





Discontinued Medications





Albuterol (Albuterol 0.083% 2.5 Mg/3 Ml Neb Soln)  2.5 mg NEB ONETIME PRN


   PRN Reason: Wheezing


Bupivacaine HCl (Bupivacaine 0.5% 30 Ml Sdv) Confirm Administered Dose 30 ml 

.ROUTE .STK-MED ONE


   Stop: 06/17/21 07:35


Bupivacaine HCl (Bupivacaine 0.5% 30 Ml Sdv) Confirm Administered Dose 120 ml 

.ROUTE .STK-MED ONE


   Stop: 06/17/21 12:10


Cefazolin Sodium (Cefazolin 1 Gm Vial) Confirm Administered Dose 1 gm .ROUTE 

.STK-MED ONE


   Stop: 06/17/21 12:14


Cefazolin Sodium (Cefazolin 1 Gm Vial) Confirm Administered Dose 2 gm .ROUTE 

.STK-MED ONE


   Stop: 06/17/21 12:15


Droperidol (Droperidol 5 Mg/2 Ml Sdv)  0.625 mg IVPUSH ONETIME PRN


   PRN Reason: Nausea/Vomiting


Fentanyl (Fentanyl 100 Mcg/2 Ml Sdv)  50 mcg IVPUSH Q5M PRN


   PRN Reason: Pain (mild 1-3)


Fentanyl (Fentanyl 100 Mcg/2 Ml Sdv) Confirm Administered Dose 100 mcg .ROUTE 

.STK-MED ONE


   Stop: 06/17/21 08:55


Hydromorphone HCl (Hydromorphone 2 Mg/Ml Syringe)  0.5 mg IVPUSH Q10M PRN


   PRN Reason: Pain (moderate 4-6)


Hydromorphone HCl (Hydromorphone 2 Mg/Ml Syringe) Confirm Administered Dose 2 mg

.ROUTE .STK-MED ONE


   Stop: 06/17/21 13:54


Lactated Ringer's (Ringers, Lactated)  1,000 mls @ 125 mls/hr IV ASDIRECTED AMAN


   Last Admin: 06/18/21 06:28 Dose:  125 mls/hr


   Documented by: 


Propofol (Diprivan 100 Ml) Confirm Administered Dose 100 mls @ as directed 

.ROUTE .STK-MED ONE


   Stop: 06/17/21 09:30


Acetaminophen (Ofirmev 1000 Mg/100 Ml) Confirm Administered Dose 100 mls @ as 

directed .ROUTE .STK-MED ONE


   Stop: 06/17/21 10:09


Ketorolac Tromethamine (Ketorolac 30 Mg/Ml Sdv) Confirm Administered Dose 30 mg 

.ROUTE .STCatapooolt-MED ONE


   Stop: 06/17/21 13:37


Ketorolac Tromethamine (Ketorolac 15 Mg/Ml Sdv)  15 mg IVPUSH Q6H AMAN


   Stop: 06/18/21 14:01


   Last Admin: 06/18/21 13:13 Dose:  15 mg


   Documented by: 


Lidocaine (Lidocaine 2% 5 Ml Sdv) Confirm Administered Dose 5 ml .ROUTE .STCatapooolt-MED

ONE


   Stop: 06/17/21 09:16


Lidocaine (Lidocaine 2% 5 Ml Sdv) Confirm Administered Dose 5 ml .ROUTE .STCatapooolt-MED

ONE


   Stop: 06/17/21 09:16


Lidocaine HCl (Lidocaine 1% 5 Ml Sdv) Confirm Administered Dose 0 ml .ROUTE 

.[place]ak-MED ONE


   Stop: 06/17/21 09:16


Metoclopramide HCl (Metoclopramide 10 Mg/2 Ml Sdv)  10 mg IVPUSH ONETIME PRN


   PRN Reason: Nausea/Vomiting


Midazolam HCl (Midazolam 1 Mg/Ml 2 Ml Sdv) Confirm Administered Dose 2 mg .ROUTE

.[place]-MED ONE


   Stop: 06/17/21 09:29


Morphine Sulfate (Morphine 2 Mg/Ml Syringe)  2 mg IVPUSH Q10M PRN


   PRN Reason: Pain (severe 7-10)


Morphine Sulfate (Morphine 10 Mg/Ml Syringe) Confirm Administered Dose 10 mg 

.ROUTE .Peak-MED ONE


   Stop: 06/17/21 09:17


Naloxone HCl (Naloxone 0.4 Mg/Ml Syringe)  0.1 mg IVPUSH ASDIRECTED PRN


   PRN Reason: Respiratory Depression


Octyl Cyanoacrylate (Octyl 2-Cyanoacrylate 1 Tube) Confirm Administered Dose 1 

applic .ROUTE .Peak-MED ONE


   Stop: 06/17/21 07:36


Ondansetron HCl (Ondansetron 4 Mg/2 Ml Sdv)  4 mg IVPUSH ONETIME PRN


   PRN Reason: Nausea/Vomiting


Ondansetron HCl (Ondansetron 4 Mg/2 Ml Sdv) Confirm Administered Dose 4 mg 

.ROUTE .STCatapooolt-MED ONE


   Stop: 06/17/21 09:16


Ondansetron HCl (Ondansetron 4 Mg/2 Ml Sdv) Confirm Administered Dose 4 mg 

.ROUTE .Peak-MED ONE


   Stop: 06/17/21 09:16


Rocuronium Bromide (Rocuronium Bromide 50 Mg/5 Ml Syringe) Confirm Administered 

Dose 50 mg .ROUTE .STK-MED ONE


   Stop: 06/17/21 09:16


Rocuronium Bromide (Rocuronium Bromide 50 Mg/5 Ml Syringe) Confirm Administered 

Dose 50 mg .ROUTE .STK-MED ONE


   Stop: 06/17/21 12:15


Rocuronium Bromide (Rocuronium Bromide 50 Mg/5 Ml Syringe) Confirm Administered 

Dose 50 mg .ROUTE .STK-MED ONE


   Stop: 06/17/21 12:47


Sugammadex Sodium (Sugammadex Sodium 200 Mg/2 Ml Vial) Confirm Administered Dose

200 mg .ROUTE .STK-MED ONE


   Stop: 06/17/21 09:16











- Exam


Wound/Incisions: Healing Well, Dressing Dry and Intact, Other (Drain with dark 

bloody output.)


HEENT: Pupils Equal


Lungs: Normal Respiratory Effort


Cardiovascular: Regular Rate


GI/Abdominal Exam: Soft, Non-Tender, No Distention, No Mass


Extremities: Normal Inspection


Skin: Warm, Dry, Intact


Neurological: No New Focal Deficit





Sepsis Event Note





- Evaluation


Sepsis Screening Result: No Definite Risk





- Focused Exam


Vital Signs: 


                                   Vital Signs











  Temp Pulse Resp BP Pulse Ox


 


 06/18/21 15:30  36.4 C  106 H  17  102/56 L  98


 


 06/18/21 11:44  36.9 C  88  18   97


 


 06/18/21 07:26  36.6 C  81  16  115/75  96














- Problem List & Annotations


(1) Acute cholecystitis due to biliary calculus


SNOMED Code(s): 16241003427493


   Code(s): K80.00 - CALCULUS OF GALLBLADDER W ACUTE CHOLECYST W/O OBSTRUCTION  

Status: Acute   Current Visit: Yes   





- Problem List Review


Problem List Initiated/Reviewed/Updated: Yes





- My Orders


Last 24 Hours: 


                               Active Orders 24 hr











 Category Date Time Status


 


 Remove Tsai Catheter [Urinary Catheter Removal] [RC] Care  06/18/21 07:19 

Active





 PER UNIT ROUTINE   


 


 Regular Diet [DIET] Diet  06/18/21 Lunch Active


 


 Acetaminophen [Ofirmev 1000 mg/100 ml] 1,000 mg Med  06/17/21 20:00 Active





 Premix Bag 1 bag   





 IV ONETIME   


 


 Cyclobenzaprine [Flexeril] Med  06/17/21 22:00 Active





 5 mg PO TID   


 


 polyethylene glycoL 3350 [MiraLAX] Med  06/18/21 09:00 Active





 17 gm PO DAILY   








                                Medication Orders





Cyclobenzaprine HCl (Cyclobenzaprine 5 Mg Tab)  5 mg PO TID [facility]


   Last Admin: 06/18/21 13:12  Dose: 5 mg


   Documented by: THOM


   Admin: 06/18/21 06:29  Dose: 5 mg


   Documented by: HCRIS


   Admin: 06/17/21 22:06  Dose: 5 mg


   Documented by: CHRIS


Hydromorphone HCl (Hydromorphone 2 Mg/Ml Syringe)  0.5 mg IVPUSH Q1H PRN


   PRN Reason: Pain (severe 7-10)


Acetaminophen 1,000 mg/ Premix  100 mls @ 400 mls/hr IV ONETIME PRN


   PRN Reason: PAIN


Oxycodone HCl (Oxycodone 5 Mg Tab)  10 mg PO Q4H PRN


   PRN Reason: Abdominal Pain


   Last Admin: 06/18/21 07:41  Dose: 10 mg


   Documented by: GURPREET   Cosigned by: ASIA


   Admin: 06/17/21 20:47  Dose: 10 mg


   Documented by: CHRIS


Polyethylene Glycol (Polyethylene Glycol 3350 Powder 17 Gm Packet)  17 gm PO 

DAILY [facility]


   Last Admin: 06/18/21 08:39  Dose: 17 gm


   Documented by: GURPREET   Cosigned by: ASIA


Sodium Chloride (Sodium Chloride 0.9% 10 Ml Sdv)  10 ml IV ASDIRECTED PRN


   PRN Reason: IV Use


Sodium Chloride (Sodium Chloride 0.9% 10 Ml Syringe)  10 ml FLUSH ASDIRECTED PRN


   PRN Reason: Keep Vein Open


Sodium Chloride (Sodium Chloride 0.9% 2.5 Ml Syringe)  2.5 ml FLUSH ASDIRECTED 

PRN


   PRN Reason: Keep Vein Open











- Plan


Plan                        (Free Text/Narrative):: 


The patient is doing well this morning and appears comfortable. Will remove 

Tsai catheter and advance diet to regular. Can discontinue IV fluids. No need 

for IV antibiotics. LFTs all appear normal. Hemoglobin slightly down but not 

unexpected for this case. Vital signs are stable. Continue to schedule IV 

Tylenol and IV Toradol. Continue scheduled Flexeril. Will discharge home likely 

tomorrow.

## 2021-06-18 NOTE — [id number]
Post Anesthesia Note





- EVALUATION WITHIN 48HRS OF ANESTHETIC


Vital Signs in Normal Range: Yes


Patient Participated in Evaluation: Yes


Respiratory Function Stable: Yes


Airway Patent: Yes


Cardiovascular Function Stable: Yes


Hydration Status Stable: Yes


Pain Control Satisfactory: Yes


Nausea and Vomiting Control Satisfactory: Yes


Mental Status Recovered: Yes


Vital Signs: 


                                Last Vital Signs











Temp  36.9 C   06/18/21 04:00


 


Pulse  88   06/18/21 04:00


 


Resp  17   06/18/21 04:00


 


BP  113/77   06/18/21 04:00


 


Pulse Ox  95   06/18/21 04:00

## 2021-06-20 NOTE — PCM.[email or web address]
- Free Text/Narrative


Note: 





Late entry:





Drain and On-Q removed without difficulty.  No drainage.





Patient discharged.